# Patient Record
Sex: FEMALE | Race: WHITE | Employment: FULL TIME | URBAN - METROPOLITAN AREA
[De-identification: names, ages, dates, MRNs, and addresses within clinical notes are randomized per-mention and may not be internally consistent; named-entity substitution may affect disease eponyms.]

---

## 2020-04-22 ENCOUNTER — HOSPITAL ENCOUNTER (EMERGENCY)
Facility: CLINIC | Age: 35
Discharge: HOME | End: 2020-04-22
Attending: EMERGENCY MEDICINE | Admitting: EMERGENCY MEDICINE
Payer: COMMERCIAL

## 2020-04-22 ENCOUNTER — APPOINTMENT (OUTPATIENT)
Dept: ULTRASOUND IMAGING | Facility: CLINIC | Age: 35
End: 2020-04-22
Attending: EMERGENCY MEDICINE
Payer: COMMERCIAL

## 2020-04-22 VITALS
HEART RATE: 85 BPM | OXYGEN SATURATION: 97 % | HEIGHT: 69 IN | SYSTOLIC BLOOD PRESSURE: 132 MMHG | BODY MASS INDEX: 43.4 KG/M2 | WEIGHT: 293 LBS | DIASTOLIC BLOOD PRESSURE: 77 MMHG | TEMPERATURE: 98.6 F | RESPIRATION RATE: 18 BRPM

## 2020-04-22 DIAGNOSIS — K80.50 BILIARY COLIC: ICD-10-CM

## 2020-04-22 DIAGNOSIS — K82.8 GALLBLADDER SLUDGE: ICD-10-CM

## 2020-04-22 LAB
ALBUMIN SERPL-MCNC: 3.8 G/DL (ref 3.4–5)
ALBUMIN UR-MCNC: NEGATIVE MG/DL
ALP SERPL-CCNC: 87 U/L (ref 40–150)
ALT SERPL W P-5'-P-CCNC: 38 U/L (ref 0–50)
ANION GAP SERPL CALCULATED.3IONS-SCNC: 6 MMOL/L (ref 3–14)
APPEARANCE UR: CLEAR
AST SERPL W P-5'-P-CCNC: 16 U/L (ref 0–45)
BACTERIA #/AREA URNS HPF: ABNORMAL /HPF
BASOPHILS # BLD AUTO: 0 10E9/L (ref 0–0.2)
BASOPHILS NFR BLD AUTO: 0.2 %
BILIRUB SERPL-MCNC: 0.5 MG/DL (ref 0.2–1.3)
BILIRUB UR QL STRIP: NEGATIVE
BUN SERPL-MCNC: 9 MG/DL (ref 7–30)
CALCIUM SERPL-MCNC: 9.3 MG/DL (ref 8.5–10.1)
CHLORIDE SERPL-SCNC: 105 MMOL/L (ref 94–109)
CO2 SERPL-SCNC: 24 MMOL/L (ref 20–32)
COLOR UR AUTO: YELLOW
CREAT SERPL-MCNC: 0.63 MG/DL (ref 0.52–1.04)
DIFFERENTIAL METHOD BLD: NORMAL
EOSINOPHIL # BLD AUTO: 0.2 10E9/L (ref 0–0.7)
EOSINOPHIL NFR BLD AUTO: 1.9 %
ERYTHROCYTE [DISTWIDTH] IN BLOOD BY AUTOMATED COUNT: 13.6 % (ref 10–15)
GFR SERPL CREATININE-BSD FRML MDRD: >90 ML/MIN/{1.73_M2}
GLUCOSE SERPL-MCNC: 98 MG/DL (ref 70–99)
GLUCOSE UR STRIP-MCNC: NEGATIVE MG/DL
HCG UR QL: NEGATIVE
HCT VFR BLD AUTO: 40.3 % (ref 35–47)
HGB BLD-MCNC: 13.8 G/DL (ref 11.7–15.7)
HGB UR QL STRIP: NEGATIVE
IMM GRANULOCYTES # BLD: 0 10E9/L (ref 0–0.4)
IMM GRANULOCYTES NFR BLD: 0.2 %
KETONES UR STRIP-MCNC: NEGATIVE MG/DL
LEUKOCYTE ESTERASE UR QL STRIP: ABNORMAL
LIPASE SERPL-CCNC: 65 U/L (ref 73–393)
LYMPHOCYTES # BLD AUTO: 1.9 10E9/L (ref 0.8–5.3)
LYMPHOCYTES NFR BLD AUTO: 18.6 %
MCH RBC QN AUTO: 29.6 PG (ref 26.5–33)
MCHC RBC AUTO-ENTMCNC: 34.2 G/DL (ref 31.5–36.5)
MCV RBC AUTO: 86 FL (ref 78–100)
MONOCYTES # BLD AUTO: 0.9 10E9/L (ref 0–1.3)
MONOCYTES NFR BLD AUTO: 8.3 %
MUCOUS THREADS #/AREA URNS LPF: PRESENT /LPF
NEUTROPHILS # BLD AUTO: 7.2 10E9/L (ref 1.6–8.3)
NEUTROPHILS NFR BLD AUTO: 70.8 %
NITRATE UR QL: NEGATIVE
NRBC # BLD AUTO: 0 10*3/UL
NRBC BLD AUTO-RTO: 0 /100
PH UR STRIP: 6.5 PH (ref 5–7)
PLATELET # BLD AUTO: 389 10E9/L (ref 150–450)
POTASSIUM SERPL-SCNC: 3.9 MMOL/L (ref 3.4–5.3)
PROT SERPL-MCNC: 8.3 G/DL (ref 6.8–8.8)
RBC # BLD AUTO: 4.67 10E12/L (ref 3.8–5.2)
RBC #/AREA URNS AUTO: 0 /HPF (ref 0–2)
SODIUM SERPL-SCNC: 135 MMOL/L (ref 133–144)
SOURCE: ABNORMAL
SP GR UR STRIP: 1.02 (ref 1–1.03)
SQUAMOUS #/AREA URNS AUTO: 4 /HPF (ref 0–1)
UROBILINOGEN UR STRIP-MCNC: NORMAL MG/DL (ref 0–2)
WBC # BLD AUTO: 10.2 10E9/L (ref 4–11)
WBC #/AREA URNS AUTO: 3 /HPF (ref 0–5)

## 2020-04-22 PROCEDURE — 81001 URINALYSIS AUTO W/SCOPE: CPT | Performed by: EMERGENCY MEDICINE

## 2020-04-22 PROCEDURE — 83690 ASSAY OF LIPASE: CPT | Performed by: EMERGENCY MEDICINE

## 2020-04-22 PROCEDURE — 80053 COMPREHEN METABOLIC PANEL: CPT | Performed by: EMERGENCY MEDICINE

## 2020-04-22 PROCEDURE — 87086 URINE CULTURE/COLONY COUNT: CPT | Performed by: EMERGENCY MEDICINE

## 2020-04-22 PROCEDURE — 99284 EMERGENCY DEPT VISIT MOD MDM: CPT | Mod: 25

## 2020-04-22 PROCEDURE — 76705 ECHO EXAM OF ABDOMEN: CPT

## 2020-04-22 PROCEDURE — 81025 URINE PREGNANCY TEST: CPT | Performed by: EMERGENCY MEDICINE

## 2020-04-22 PROCEDURE — 85025 COMPLETE CBC W/AUTO DIFF WBC: CPT | Performed by: EMERGENCY MEDICINE

## 2020-04-22 RX ORDER — TRAMADOL HYDROCHLORIDE 50 MG/1
50 TABLET ORAL EVERY 6 HOURS PRN
Qty: 10 TABLET | Refills: 0 | Status: SHIPPED | OUTPATIENT
Start: 2020-04-22 | End: 2020-04-25

## 2020-04-22 RX ORDER — IBUPROFEN 600 MG/1
600 TABLET, FILM COATED ORAL EVERY 6 HOURS PRN
Qty: 30 TABLET | Refills: 0 | Status: SHIPPED | OUTPATIENT
Start: 2020-04-22 | End: 2020-05-22

## 2020-04-22 RX ORDER — ONDANSETRON 4 MG/1
4 TABLET, ORALLY DISINTEGRATING ORAL EVERY 8 HOURS PRN
Qty: 15 TABLET | Refills: 0 | Status: SHIPPED | OUTPATIENT
Start: 2020-04-22

## 2020-04-22 ASSESSMENT — ENCOUNTER SYMPTOMS
NAUSEA: 1
VOMITING: 0
SHORTNESS OF BREATH: 0
DIARRHEA: 1
ABDOMINAL PAIN: 1

## 2020-04-22 ASSESSMENT — MIFFLIN-ST. JEOR: SCORE: 2180.53

## 2020-04-22 NOTE — ED PROVIDER NOTES
"  History     Chief Complaint:  Abdominal Pain      HPI   Harriet Fletcher is a 34 year old female who presents with abdominal pain. Harriet states that she has had intermittent episodes of abdominal pain since 4/4/2020. Initially, the pain dissipated. However, the abdominal pain returned this week and is mostly in her upper abdomen. Yesterday, the patient describes that she was in tears due the severity of her pain. She states that the pain usually lasts for hours before spontaneously improving. She had a severe episode of pain that started last night and has since improved. She has minimal to no pain currently in the ED. She cannot identify any clear exacerbating factors. Today she had a virtual visit and was referred to the ED for further evaluation. During their evaluation, they localized her pain to her right upper quadrant. Patient describes the pain as \"pressure.\" She has been taking Tums, Gas-Ex, and maximum strength Pepcid without any alleviation of her pain. On evaluation in the emergency department, she does not currently have significant pain. In addition to her pain, she has been having associated nausea but no vomiting. She has also had intermittent diarrhea. No rectal bleeding. Harriet was last able to eat applesauce at 0000 and has been sipping water since then. She denies urinary symptoms, shortness of breath, and menstrual concerns. No lower abdominal pain. She denies fever. No chest pain, shortness of breath, or pleuritic pain.  She has no history of abdominal surgeries or GI disease.     Allergies:  Milk prot-turm-pepper-pineapple  Fd and C red No.40    Medications:    The patient is currently on no regular medications.     Past Medical History:    Adjustment disorder with mixed anxiety and depressed mood     Past Surgical History:    Orthopedic surgery, right leg     Family History:    Father - colon cancer     Social History:  The patient was alone.   Smoking Status: never  Smokeless Tobacco: " "never  Alcohol Use: yes       Review of Systems   Respiratory: Negative for shortness of breath.    Gastrointestinal: Positive for abdominal pain, diarrhea and nausea. Negative for vomiting.   Genitourinary: Negative.  Negative for menstrual problem.   All other systems reviewed and are negative.      Physical Exam     Patient Vitals for the past 24 hrs:   BP Temp Temp src Pulse Heart Rate Resp SpO2 Height Weight   04/22/20 1028 (!) 131/107 98.6  F (37  C) Oral 118 118 16 97 % 1.753 m (5' 9\") 140.6 kg (310 lb)       Physical Exam  General: Well appearing, nontoxic.  Resting comfortably  Head:  Scalp, face, and head appear normal  Eyes:  Pupils are equal, round, and reactive to light    Conjunctivae non-injected and sclerae white  ENT:    The external nose is normal    Pinnae are normal  Neck:  Trachea is in the midline  CV:  Regular rate and rhythm     Normal S1/S2, no S3/S4    No murmur or rub  Resp:  Lungs are clear and equal bilaterally    There is no tachypnea    No increased work of breathing    No rales, wheezing, or rhonchi  GI:  Abdomen is soft, morbidly obese, no rigidity or guarding    No distension, or mass    + mild RUQ and epigastric tenderness or rebound tenderness. [place] sign negative. No RLQ or other abdominal tenderness to palpation. No CVA tenderness  MS:  Normal muscular tone  Skin:  No rash or acute skin lesions noted  Neuro: Awake and alert    Speech is normal and fluent  Psych:  Normal affect.  Appropriate interactions.      Emergency Department Course      Imaging:  Radiology findings were communicated with the patient who voiced understanding of the findings.    US Abdomen Limited (RUQ)  IMPRESSION: Some gallbladder wall thickening and distention noted, no  gallstones or focal tenderness is seen over the gallbladder. Findings  are equivocal for acute cholecystitis. Consider HIDA scan for further  evaluation if clinically indicated.  Report per radiology     Laboratory:  Laboratory findings " were communicated with the patient who voiced understanding of the findings.    CBC: AWNL. (WBC 10.2, HGB 13.8, )   CMP: AWNL (Creatinine 0.63)     Lipase: 65 (L)     HCG Qualitative Urine: negative     UA: Yellow, clear urine. LE urine moderate, bacteria urine few, squamous epithelial/HPF urine 4 (H), mucous urine present o/w WNL    Urine Culture Aerobic Bacterial: Pending    Emergency Department Course:  Past medical records, nursing notes, and vitals reviewed.    1214 I performed an exam of the patient as documented above.     IV was inserted and blood was drawn for laboratory testing, results above.  The patient provided a urine sample here in the emergency department. This was sent for laboratory testing, findings above.  The patient was sent for a US Abdomen Limited RUQ while in the emergency department, results above.     1355 I rechecked the patient and discussed the results of her workup thus far.     Findings and plan explained to the Patient. Patient discharged home with instructions regarding supportive care, medications, and reasons to return. The importance of close follow-up was reviewed. The patient was prescribed ibuprofen, tramadol, and Zofran.     I personally reviewed the laboratory and imaging results with the Patient and answered all related questions prior to discharge.     Impression & Plan     Medical Decision Making:  Harriet Fletcher is a 34 year old female who presents for evaluation of intermittent abdominal pain in the right upper quadrant and epigastric region. On my evaluation she is well appearing, afebrile. Abdominal exam is benign without evidence of peritonitis or acute surgical emergency. Broad ddx considered. Labs including CBC, CMP, lipase are normal.  UA neg for hematuria or pyuria. HCG negative. RUQ US reveals gallbladder sludge with localized wall thickening and distention. Clinically she does not have signs of acute cholecystitis as her pain as improved, no fever, no  leukocytosis and a benign exam. Her symptoms are consistent with biliary colic. There is no clinical, laboratory, or ultrasound evidence of choledocholithiasis, gallstone pancreatitis, or ascending cholangitis. She has had no improvement with antacids which makes GERD, gastritis, PUD less likely. Supportive outpatient management with close PCP follow up and  general surgery consultation is indicated.The patient will be prescribed analgesics and antiemetics. The patient was educated to avoid fatty foods.  The patient was told to return to ED for increasing pain, vomiting, chills, sweats, or fever.  Follow up with general surgery is indicated for outpatient consultation, this may be arranged as soon as able.  See primary care doctor this week for recheck.  Return precautions were discussed with patient. The patient's questions were answered and the patient was agreeable with discharge.     Diagnosis:    ICD-10-CM    1. Biliary colic  K80.50    2. Gallbladder sludge  K82.8        Disposition:  Discharged to home.    Discharge Medications:  New Prescriptions    IBUPROFEN (ADVIL/MOTRIN) 600 MG TABLET    Take 1 tablet (600 mg) by mouth every 6 hours as needed for fever or pain Take with food.    ONDANSETRON (ZOFRAN ODT) 4 MG ODT TAB    Take 1 tablet (4 mg) by mouth every 8 hours as needed for nausea or vomiting    TRAMADOL (ULTRAM) 50 MG TABLET    Take 1 tablet (50 mg) by mouth every 6 hours as needed for severe pain       Scribe Disclosure:  Catrina VALDOVINOS, am serving as a scribe at 10:42 AM on 4/22/2020 to document services personally performed by Chencho Lehman MD based on my observations and the provider's statements to me.   4/22/2020    EMERGENCY DEPARTMENT       Chencho Lehman MD  04/23/20 0443

## 2020-04-22 NOTE — ED AVS SNAPSHOT
Emergency Department  [address] [place] [address]  Phone:  [phone]  Fax:  [phone]                                    Harriet Fletcher   MRN: [id number]    Department:   Emergency Department   Date of Visit:  4/22/2020           After Visit Summary Signature Page    I have received my discharge instructions, and my questions have been answered. I have discussed any challenges I see with this plan with the nurse or doctor.    ..........................................................................................................................................  Patient/Patient Representative Signature      ..........................................................................................................................................  Patient Representative Print Name and Relationship to Patient    ..................................................               ................................................  Date                                   Time    ..........................................................................................................................................  Reviewed by Signature/Title    ...................................................              ..............................................  Date                                               Time          22EPIC Rev 08/18

## 2020-04-23 LAB
BACTERIA SPEC CULT: NORMAL
Lab: NORMAL
SPECIMEN SOURCE: NORMAL